# Patient Record
Sex: MALE | URBAN - METROPOLITAN AREA
[De-identification: names, ages, dates, MRNs, and addresses within clinical notes are randomized per-mention and may not be internally consistent; named-entity substitution may affect disease eponyms.]

---

## 2021-07-13 ENCOUNTER — NURSE TRIAGE (OUTPATIENT)
Dept: NURSING | Facility: CLINIC | Age: 26
End: 2021-07-13

## 2021-07-14 NOTE — TELEPHONE ENCOUNTER
"Triage Call:    Patient is calling as he \"have never had chest pain like today\".  When he leads forward, in the center of his chest, there is a lot of pain.  When he is laying on his back flat, it is fine.  When he lays on a side in the bed, the chest pain is 7-8/10 and describes it as a \"crushing\" pain.    He reports feeling the chest pain the last few days, but was much less painful/ more achy.      Got 2nd COVID shot yesterday.      Pt was advised of protocol recommendation/disposition of call 911.  He is hesitant to do that and prefers to call someone to take him.     Morena Gan RN on 7/13/2021 at 10:22 PM        COVID 19 Nurse Triage Plan/Patient Instructions    Please be aware that novel coronavirus (COVID-19) may be circulating in the community. If you develop symptoms such as fever, cough, or SOB or if you have concerns about the presence of another infection including coronavirus (COVID-19), please contact your health care provider or visit [email or web address].     Disposition/Instructions    Call to EMS/911 recommended. Follow protocol based instructions.     Bring Your Own Device:  Please also bring your smart device(s) (smart phones, tablets, laptops) and their charging cables for your personal use and to communicate with your care team during your visit.    Thank you for taking steps to prevent the spread of this virus.  o Limit your contact with others.  o Wear a simple mask to cover your cough.  o Wash your hands well and often.    Resources    [facility]: About COVID-19: [email or web address]    CDC: What to Do If You're Sick: www.cdc.gov/coronavirus/2019-ncov/about/steps-when-sick.html    CDC: Ending Home Isolation: www.cdc.gov/coronavirus/2019-ncov/hcp/disposition-in-home-patients.html     CDC: Caring for Someone: www.cdc.gov/coronavirus/2019-ncov/if-you-are-sick/care-for-someone.html     [facility]: Interim Guidance for Hospital Discharge to Home: " www.health.[facility].[place]/diseases/coronavirus/hcp/hospdischarge.pdf    [facility] clinical trials (COVID-19 research studies): [email or web address]/[email or web address]     Below are the COVID-19 hotlines at the [place] [facility] ([facility]). Interpreters are available.   o For health questions: Call 741-779-6172 or 1-509.360.1477 (7 a.m. to 7 p.m.)  o For questions about schools and childcare: Call 533-126-2279 or 1-303.762.8808 (7 a.m. to 7 p.m.)                   Reason for Disposition    [1] Chest pain lasts > 5 minutes AND [2] described as crushing, pressure-like, or heavy    Additional Information    Negative: Severe difficulty breathing (e.g., struggling for each breath, speaks in single words)    Negative: Difficult to awaken or acting confused (e.g., disoriented, slurred speech)    Negative: Shock suspected (e.g., cold/pale/clammy skin, too weak to stand, low BP, rapid pulse)    Negative: [1] Chest pain lasts > 5 minutes AND [2] history of heart disease  (i.e., heart attack, bypass surgery, angina, angioplasty, CHF; not just a heart murmur)    Protocols used: CHEST PAIN-A-AH